# Patient Record
Sex: FEMALE | Race: BLACK OR AFRICAN AMERICAN | Employment: UNEMPLOYED
[De-identification: names, ages, dates, MRNs, and addresses within clinical notes are randomized per-mention and may not be internally consistent; named-entity substitution may affect disease eponyms.]

---

## 2022-03-19 PROBLEM — E28.2 PCOS (POLYCYSTIC OVARIAN SYNDROME): Status: ACTIVE | Noted: 2019-02-21

## 2022-03-19 PROBLEM — R19.8 ABDOMINAL COMPLAINTS: Status: ACTIVE | Noted: 2018-01-02

## 2022-03-19 PROBLEM — R10.2 PELVIC PAIN: Status: ACTIVE | Noted: 2019-09-06

## 2022-03-19 PROBLEM — R10.9 ABDOMINAL PAIN: Status: ACTIVE | Noted: 2018-03-13

## 2022-03-19 PROBLEM — E66.01 SEVERE OBESITY (HCC): Status: ACTIVE | Noted: 2018-12-21

## 2022-03-19 PROBLEM — N83.201 RIGHT OVARIAN CYST: Status: ACTIVE | Noted: 2019-09-06

## 2022-03-20 PROBLEM — N83.201 CYST OF RIGHT OVARY: Status: ACTIVE | Noted: 2019-09-06

## 2024-01-31 ENCOUNTER — APPOINTMENT (OUTPATIENT)
Facility: HOSPITAL | Age: 24
End: 2024-01-31

## 2024-01-31 ENCOUNTER — HOSPITAL ENCOUNTER (EMERGENCY)
Facility: HOSPITAL | Age: 24
Discharge: HOME | End: 2024-01-31

## 2024-01-31 VITALS
OXYGEN SATURATION: 99 % | BODY MASS INDEX: 38.58 KG/M2 | HEART RATE: 71 BPM | DIASTOLIC BLOOD PRESSURE: 80 MMHG | SYSTOLIC BLOOD PRESSURE: 124 MMHG | RESPIRATION RATE: 18 BRPM | TEMPERATURE: 97 F | WEIGHT: 226 LBS | HEIGHT: 64 IN

## 2024-01-31 DIAGNOSIS — R51.9 ACUTE NONINTRACTABLE HEADACHE, UNSPECIFIED HEADACHE TYPE: ICD-10-CM

## 2024-01-31 DIAGNOSIS — S09.90XA INJURY OF HEAD, INITIAL ENCOUNTER: Primary | ICD-10-CM

## 2024-01-31 PROCEDURE — 6370000000 HC RX 637 (ALT 250 FOR IP): Performed by: PHYSICIAN ASSISTANT

## 2024-01-31 PROCEDURE — 70450 CT HEAD/BRAIN W/O DYE: CPT

## 2024-01-31 PROCEDURE — 99284 EMERGENCY DEPT VISIT MOD MDM: CPT

## 2024-01-31 PROCEDURE — 6360000002 HC RX W HCPCS: Performed by: PHYSICIAN ASSISTANT

## 2024-01-31 PROCEDURE — 96372 THER/PROPH/DIAG INJ SC/IM: CPT

## 2024-01-31 RX ORDER — BUTALBITAL, ACETAMINOPHEN AND CAFFEINE 300; 40; 50 MG/1; MG/1; MG/1
1 CAPSULE ORAL
Status: COMPLETED | OUTPATIENT
Start: 2024-01-31 | End: 2024-01-31

## 2024-01-31 RX ORDER — BUTALBITAL, ASPIRIN, AND CAFFEINE 325; 50; 40 MG/1; MG/1; MG/1
1 CAPSULE ORAL
Status: DISCONTINUED | OUTPATIENT
Start: 2024-01-31 | End: 2024-01-31 | Stop reason: SDUPTHER

## 2024-01-31 RX ORDER — KETOROLAC TROMETHAMINE 15 MG/ML
15 INJECTION, SOLUTION INTRAMUSCULAR; INTRAVENOUS
Status: COMPLETED | OUTPATIENT
Start: 2024-01-31 | End: 2024-01-31

## 2024-01-31 RX ORDER — BUTALBITAL, ACETAMINOPHEN AND CAFFEINE 50; 325; 40 MG/1; MG/1; MG/1
1 TABLET ORAL
Status: DISCONTINUED | OUTPATIENT
Start: 2024-01-31 | End: 2024-01-31 | Stop reason: SDUPTHER

## 2024-01-31 RX ADMIN — BUTALBITA,ACETAMINOPHEN AND CAFFEINE 1 CAPSULE: 50; 300; 40 CAPSULE ORAL at 20:25

## 2024-01-31 RX ADMIN — KETOROLAC TROMETHAMINE 15 MG: 15 INJECTION, SOLUTION INTRAMUSCULAR; INTRAVENOUS at 19:36

## 2024-01-31 ASSESSMENT — PAIN SCALES - GENERAL
PAINLEVEL_OUTOF10: 9

## 2024-01-31 ASSESSMENT — PAIN DESCRIPTION - LOCATION
LOCATION: HEAD

## 2024-01-31 ASSESSMENT — PAIN DESCRIPTION - ORIENTATION: ORIENTATION: RIGHT

## 2024-01-31 ASSESSMENT — PAIN DESCRIPTION - PAIN TYPE: TYPE: ACUTE PAIN

## 2024-01-31 ASSESSMENT — PAIN DESCRIPTION - DESCRIPTORS
DESCRIPTORS: THROBBING
DESCRIPTORS: THROBBING
DESCRIPTORS: POUNDING

## 2024-01-31 ASSESSMENT — PAIN - FUNCTIONAL ASSESSMENT
PAIN_FUNCTIONAL_ASSESSMENT: ACTIVITIES ARE NOT PREVENTED
PAIN_FUNCTIONAL_ASSESSMENT: 0-10
PAIN_FUNCTIONAL_ASSESSMENT: ACTIVITIES ARE NOT PREVENTED
PAIN_FUNCTIONAL_ASSESSMENT: ACTIVITIES ARE NOT PREVENTED

## 2024-01-31 ASSESSMENT — PAIN DESCRIPTION - FREQUENCY: FREQUENCY: CONTINUOUS

## 2024-01-31 ASSESSMENT — PAIN DESCRIPTION - ONSET: ONSET: GRADUAL
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
## 2024-02-01 ASSESSMENT — ENCOUNTER SYMPTOMS
COUGH: 0
SHORTNESS OF BREATH: 0
RHINORRHEA: 0
BACK PAIN: 0
VOMITING: 0
EYE DISCHARGE: 0
STRIDOR: 0
PHOTOPHOBIA: 1
SORE THROAT: 0
WHEEZING: 0
EYE REDNESS: 0
NAUSEA: 1
ABDOMINAL PAIN: 0

## 2024-02-01 NOTE — ED PROVIDER NOTES
ibuprofen (ADVIL;MOTRIN) 600 MG tablet Take 600 mg by mouth every 6 hours as needed    medroxyPROGESTERone (PROVERA) 10 MG tablet Take 10 mg by mouth daily    Norgestim-Eth Estrad Triphasic 0.18/0.215/0.25 MG-35 MCG TABS Take 1 tablet by mouth daily    rOPINIRole (REQUIP) 1 MG tablet Take 1 mg by mouth       Disposition:  D/c       Medication List        ASK your doctor about these medications      gabapentin 100 MG capsule  Commonly known as: NEURONTIN     ibuprofen 600 MG tablet  Commonly known as: ADVIL;MOTRIN     medroxyPROGESTERone 10 MG tablet  Commonly known as: PROVERA     Norgestim-Eth Estrad Triphasic 0.18/0.215/0.25 MG-35 MCG Tabs     rOPINIRole 1 MG tablet  Commonly known as: REQUIP                  Diagnosis     Clinical Impression:   1. Injury of head, initial encounter    2. Acute nonintractable headache, unspecified headache type                Marta Moore PA-C  02/01/24 0246

## 2025-02-24 ENCOUNTER — HOSPITAL ENCOUNTER (EMERGENCY)
Facility: HOSPITAL | Age: 25
Discharge: HOME | End: 2025-02-25
Payer: COMMERCIAL

## 2025-02-24 ENCOUNTER — APPOINTMENT (OUTPATIENT)
Facility: HOSPITAL | Age: 25
End: 2025-02-24
Payer: COMMERCIAL

## 2025-02-24 VITALS
RESPIRATION RATE: 18 BRPM | TEMPERATURE: 98.8 F | HEIGHT: 64 IN | OXYGEN SATURATION: 99 % | WEIGHT: 265 LBS | SYSTOLIC BLOOD PRESSURE: 141 MMHG | BODY MASS INDEX: 45.24 KG/M2 | HEART RATE: 74 BPM | DIASTOLIC BLOOD PRESSURE: 90 MMHG

## 2025-02-24 DIAGNOSIS — R51.9 ACUTE NONINTRACTABLE HEADACHE, UNSPECIFIED HEADACHE TYPE: ICD-10-CM
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
## 2025-02-24 PROCEDURE — 99284 EMERGENCY DEPT VISIT MOD MDM: CPT

## 2025-02-24 PROCEDURE — 70450 CT HEAD/BRAIN W/O DYE: CPT

## 2025-02-24 ASSESSMENT — PAIN DESCRIPTION - ORIENTATION: ORIENTATION: LEFT

## 2025-02-24 ASSESSMENT — PAIN DESCRIPTION - DESCRIPTORS: DESCRIPTORS: ACHING;POUNDING

## 2025-02-24 ASSESSMENT — PAIN DESCRIPTION - LOCATION: LOCATION: HEAD

## 2025-02-24 ASSESSMENT — PAIN - FUNCTIONAL ASSESSMENT: PAIN_FUNCTIONAL_ASSESSMENT: 0-10

## 2025-02-24 ASSESSMENT — PAIN SCALES - GENERAL: PAINLEVEL_OUTOF10: 9

## 2025-02-25 PROCEDURE — 6370000000 HC RX 637 (ALT 250 FOR IP): Performed by: PHYSICIAN ASSISTANT

## 2025-02-25 RX ORDER — NAPROXEN 500 MG/1
500 TABLET ORAL 2 TIMES DAILY
Qty: 30 TABLET | Refills: 0 | Status: SHIPPED | OUTPATIENT
Start: 2025-02-25

## 2025-02-25 RX ORDER — METHOCARBAMOL 750 MG/1
750 TABLET, FILM COATED ORAL EVERY 6 HOURS PRN
Qty: 20 TABLET | Refills: 0 | Status: SHIPPED | OUTPATIENT
Start: 2025-02-25 | End: 2025-03-07

## 2025-02-25 RX ORDER — METHOCARBAMOL 500 MG/1
500 TABLET, FILM COATED ORAL
Status: COMPLETED | OUTPATIENT
Start: 2025-02-25 | End: 2025-02-25

## 2025-02-25 RX ORDER — NAPROXEN 250 MG/1
500 TABLET ORAL
Status: COMPLETED | OUTPATIENT
Start: 2025-02-25 | End: 2025-02-25

## 2025-02-25 RX ADMIN — METHOCARBAMOL 500 MG: 500 TABLET ORAL at 01:36

## 2025-02-25 RX ADMIN — NAPROXEN 500 MG: 250 TABLET ORAL at 01:35

## 2025-02-25 ASSESSMENT — ENCOUNTER SYMPTOMS
SHORTNESS OF BREATH: 0
BACK PAIN: 0
EYE PAIN: 0
COUGH: 0
EYE REDNESS: 0
SINUS PRESSURE: 0
CHEST TIGHTNESS: 0
ABDOMINAL PAIN: 0
PHOTOPHOBIA: 0
FACIAL SWELLING: 0
SINUS PAIN: 0
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
